# Patient Record
Sex: MALE
[De-identification: names, ages, dates, MRNs, and addresses within clinical notes are randomized per-mention and may not be internally consistent; named-entity substitution may affect disease eponyms.]

---

## 2018-11-30 ENCOUNTER — HOSPITAL ENCOUNTER (EMERGENCY)
Dept: HOSPITAL 14 - H.ER | Age: 33
Discharge: HOME | End: 2018-11-30
Payer: SELF-PAY

## 2018-11-30 VITALS
SYSTOLIC BLOOD PRESSURE: 140 MMHG | DIASTOLIC BLOOD PRESSURE: 84 MMHG | RESPIRATION RATE: 18 BRPM | TEMPERATURE: 99 F | HEART RATE: 109 BPM

## 2018-11-30 VITALS — OXYGEN SATURATION: 96 %

## 2018-11-30 DIAGNOSIS — R00.0: ICD-10-CM

## 2018-11-30 DIAGNOSIS — F32.9: ICD-10-CM

## 2018-11-30 DIAGNOSIS — F10.10: Primary | ICD-10-CM

## 2018-11-30 LAB
ALBUMIN SERPL-MCNC: 4.7 G/DL (ref 3.5–5)
ALBUMIN/GLOB SERPL: 1.3 {RATIO} (ref 1–2.1)
ALT SERPL-CCNC: 69 U/L (ref 21–72)
APAP SERPL-MCNC: < 10 UG/ML (ref 10–30)
AST SERPL-CCNC: 88 U/L (ref 17–59)
BASOPHILS # BLD AUTO: 0.1 K/UL (ref 0–0.2)
BASOPHILS NFR BLD: 0.5 % (ref 0–2)
BILIRUBIN,DIRECT: 0.3 MG/ML (ref 0–0.4)
BUN SERPL-MCNC: 9 MG/DL (ref 9–20)
CALCIUM SERPL-MCNC: 9.2 MG/DL (ref 8.4–10.2)
EOSINOPHIL # BLD AUTO: 0.1 K/UL (ref 0–0.7)
EOSINOPHIL NFR BLD: 1.2 % (ref 0–4)
ERYTHROCYTE [DISTWIDTH] IN BLOOD BY AUTOMATED COUNT: 14.2 % (ref 11.5–14.5)
GFR NON-AFRICAN AMERICAN: > 60
HGB BLD-MCNC: 14.4 G/DL (ref 12–18)
LYMPHOCYTES # BLD AUTO: 2.1 K/UL (ref 1–4.3)
LYMPHOCYTES NFR BLD AUTO: 18.6 % (ref 20–40)
MCH RBC QN AUTO: 28.8 PG (ref 27–31)
MCHC RBC AUTO-ENTMCNC: 33 G/DL (ref 33–37)
MCV RBC AUTO: 87.4 FL (ref 80–94)
MONOCYTES # BLD: 0.5 K/UL (ref 0–0.8)
MONOCYTES NFR BLD: 4.7 % (ref 0–10)
NEUTROPHILS # BLD: 8.6 K/UL (ref 1.8–7)
NEUTROPHILS NFR BLD AUTO: 75 % (ref 50–75)
NRBC BLD AUTO-RTO: 0.2 % (ref 0–0)
PLATELET # BLD: 277 K/UL (ref 130–400)
PMV BLD AUTO: 8 FL (ref 7.2–11.7)
RBC # BLD AUTO: 4.99 MIL/UL (ref 4.4–5.9)
SALICYLATES SERPL-MCNC: < 1 MG/DL
WBC # BLD AUTO: 11.4 K/UL (ref 4.8–10.8)

## 2018-11-30 PROCEDURE — 93005 ELECTROCARDIOGRAM TRACING: CPT

## 2018-11-30 PROCEDURE — 82948 REAGENT STRIP/BLOOD GLUCOSE: CPT

## 2018-11-30 PROCEDURE — 99285 EMERGENCY DEPT VISIT HI MDM: CPT

## 2018-11-30 PROCEDURE — 85025 COMPLETE CBC W/AUTO DIFF WBC: CPT

## 2018-11-30 PROCEDURE — 80048 BASIC METABOLIC PNL TOTAL CA: CPT

## 2018-11-30 PROCEDURE — 80076 HEPATIC FUNCTION PANEL: CPT

## 2018-11-30 NOTE — ED PDOC
- Laboratory Results


Result Diagrams: 


                                 11/30/18 03:09





                                 11/30/18 03:09





- ECG


O2 Sat by Pulse Oximetry: 96





- Progress


ED Course And Treament: 





700:  Took over care from Dr. Araiza.  Fu on crisis.  





740:  Stable.  AAOx3.  Pain free.  Tolerated PO.  FU with pcp.  Ambulated with 

no issues.  Crisis saw pt.  Does not meet criteria for admit.  Clinical sobriety

reached.  Has capacity to make decisions. 





Disposition





- Clinical Impression


Clinical Impression: 


 Depression, Alcohol abuse








- POA


Present On Arrival: None





- Disposition


Referrals: 


[facility] [Outside] - 12/03/18


Disposition: Routine/Home


Disposition Time: 07:42


Condition: STABLE


Additional Instructions: 


Return if not better in 3 days. 


Instructions:  Depression, Alcohol Abuse and Alcoholism (DC)

## 2018-11-30 NOTE — CARD
--------------- APPROVED REPORT --------------





Date of service: 11/30/2018



EKG Measurement

Heart [id number]

IA [id number]

[id number]

[id number]

[id number]



<Conclusion>

Sinus tachycardia

Otherwise normal ECG

## 2018-11-30 NOTE — CARD
--------------- APPROVED REPORT --------------





Date of service: 11/30/2018



EKG Measurement

Heart [id number]

MS [id number]

[id number]

[id number]

[id number]



<Conclusion>

Sinus tachycardia

Otherwise normal ECG

## 2018-11-30 NOTE — ED PDOC
HPI: Psych/Substance Abuse


Time Seen by Provider: 18 02:19


Chief Complaint (Nursing): Substance Abuse


ED Caveat: Intoxicated


History Per: Other (Girlfriend)


Onset/Duration Of Symptoms: Hrs


Current Symptoms Are (Timing): Still Present


Additional Complaint(s): 





(Hx limited by intoxication, history gathered by girlfriend)


No PMHx presenting with alcohol intoxication and possible overdose.  Girlfriend 

states that it is the one year anniversary of his brother's death and also has a

terminally ill father, has had a lot of home stress and today drank alcohol and 

took 3 benadryl to "kill himself."  Patient denies this, states he feels fine, 

states he did not take benadryl but took "xanax", 3 pills to go to sleep, but 

denies suicidal or homidical ideation.  Girlfriend states that he has made 

suicidal remarks in the past.





Past Medical History


Reviewed: Vital Signs, Unable To Obtain


Vital Signs: 





                                Last Vital Signs











Temp  97.8 F   18 02:08


 


Pulse  125 H  18 02:08


 


Resp  17   18 02:08


 


BP  155/106 H  18 02:08


 


Pulse Ox  96   18 02:08














- Family History


Family History: States: Unknown Family Hx





- Immunization History


Hx Tetanus Toxoid Vaccination: No





- Home Medications


Home Medications: 


                                Ambulatory Orders











 Medication  Instructions  Recorded


 


Cephalexin [Keflex] 500 mg PO TID #21 tab 16














- Allergies


Allergies/Adverse Reactions: 


                                    Allergies











Allergy/AdvReac Type Severity Reaction Status Date / Time


 


No Known Allergies Allergy   Verified 16 13:18














Review of Systems


Review Of Systems: ROS cannot be obtained secondary to pt's inabilty to answer 

questions.





Physical Exam





- Reviewed


Nursing Documentation Reviewed: Yes


Vital Signs Reviewed: Yes





- Physical Exam


Appears: Negative for: Well (Disheveled, intoxicated appearing)


Head Exam: Positive for: ATRAUMATIC, NORMAL INSPECTION, NORMOCEPHALIC


Skin: Positive for: Normal Color, Warm, DRY


Eye Exam: Positive for: EOMI, Normal appearance, PERRL


ENT: Positive for: Other (Dry mucus membranes, cracked lips)


Neck: Positive for: Normal, Painless ROM, Supple


Cardiovascular/Chest: Positive for: Tachycardia


Respiratory: Positive for: CNT, Normal Breath Sounds


Gastrointestinal/Abdominal: Positive for: Normal Exam, Soft


Back: Positive for: Normal Inspection


Extremity: Positive for: Normal ROM


Neurologic/Psych: Positive for: Alert, Oriented





- Laboratory Results


Result Diagrams: 


                                 18 03:09





                                 18 03:09





- ECG


O2 Sat by Pulse Oximetry: 96





Medical Decision Making


Medical Decision Makin


Patient presenting with apparent overdose


--Patient drowsy currently, tachycardic, dry mucus membranes


--Poison control recommends monitoring and repeat EKGs, symptomatic treatment 


--Pending crisis eval





0500


--Patient stable





0700


--Pending crisis decision, will endose to Dr Perez





Disposition





- Clinical Impression


Clinical Impression: 


 Overdose








- Patient ED Disposition


Is Patient to be Admitted: Transfer of Care





- Disposition


Disposition: Transfer of Care


Disposition Time: 07:00


Condition: IMPROVED


Forms:  [facility] (English)


Patient Signed Over To: Fito Vasquez


Handoff Comments: pending crisis